# Patient Record
Sex: FEMALE | Race: WHITE | NOT HISPANIC OR LATINO | Employment: FULL TIME | URBAN - METROPOLITAN AREA
[De-identification: names, ages, dates, MRNs, and addresses within clinical notes are randomized per-mention and may not be internally consistent; named-entity substitution may affect disease eponyms.]

---

## 2019-03-20 ENCOUNTER — OCCUPATIONAL HEALTH (OUTPATIENT)
Dept: URGENT CARE | Facility: CLINIC | Age: 49
End: 2019-03-20

## 2019-03-20 DIAGNOSIS — Z02.83 ENCOUNTER FOR DRUG SCREENING: Primary | ICD-10-CM

## 2019-03-20 LAB
CTP QC/QA: YES
POC 10 PANEL DRUG SCREEN: NEGATIVE

## 2019-03-20 PROCEDURE — 80305 POCT RAPID DRUG SCREEN 10 PANEL: ICD-10-PCS | Mod: QW,S$GLB,, | Performed by: INTERNAL MEDICINE

## 2019-03-20 PROCEDURE — 80305 DRUG TEST PRSMV DIR OPT OBS: CPT | Mod: QW,S$GLB,, | Performed by: INTERNAL MEDICINE

## 2020-02-02 ENCOUNTER — OFFICE VISIT (OUTPATIENT)
Dept: URGENT CARE | Facility: CLINIC | Age: 50
End: 2020-02-02

## 2020-02-02 VITALS
SYSTOLIC BLOOD PRESSURE: 168 MMHG | BODY MASS INDEX: 43.32 KG/M2 | WEIGHT: 260 LBS | TEMPERATURE: 99 F | DIASTOLIC BLOOD PRESSURE: 90 MMHG | OXYGEN SATURATION: 96 % | HEART RATE: 114 BPM | RESPIRATION RATE: 20 BRPM | HEIGHT: 65 IN

## 2020-02-02 DIAGNOSIS — J10.1 INFLUENZA A: Primary | ICD-10-CM

## 2020-02-02 DIAGNOSIS — R50.9 FEVER, UNSPECIFIED FEVER CAUSE: ICD-10-CM

## 2020-02-02 LAB
CTP QC/QA: YES
FLUAV AG NPH QL: POSITIVE
FLUBV AG NPH QL: NEGATIVE

## 2020-02-02 PROCEDURE — 87804 POCT INFLUENZA A/B: ICD-10-PCS | Mod: 59,QW,TIER,S$GLB | Performed by: PHYSICIAN ASSISTANT

## 2020-02-02 PROCEDURE — 99203 PR OFFICE/OUTPT VISIT, NEW, LEVL III, 30-44 MIN: ICD-10-PCS | Mod: TIER,25,S$GLB, | Performed by: PHYSICIAN ASSISTANT

## 2020-02-02 PROCEDURE — 99203 OFFICE O/P NEW LOW 30 MIN: CPT | Mod: TIER,25,S$GLB, | Performed by: PHYSICIAN ASSISTANT

## 2020-02-02 PROCEDURE — 87804 INFLUENZA ASSAY W/OPTIC: CPT | Mod: QW,TIER,S$GLB, | Performed by: PHYSICIAN ASSISTANT

## 2020-02-02 RX ORDER — OSELTAMIVIR PHOSPHATE 75 MG/1
75 CAPSULE ORAL 2 TIMES DAILY
Qty: 10 CAPSULE | Refills: 0 | Status: SHIPPED | OUTPATIENT
Start: 2020-02-02 | End: 2020-02-07

## 2020-02-02 RX ORDER — ONDANSETRON 4 MG/1
4 TABLET, ORALLY DISINTEGRATING ORAL EVERY 8 HOURS PRN
Qty: 12 TABLET | Refills: 0 | Status: SHIPPED | OUTPATIENT
Start: 2020-02-02

## 2020-02-02 RX ORDER — DICLOFENAC SODIUM 75 MG/1
75 TABLET, DELAYED RELEASE ORAL 2 TIMES DAILY PRN
COMMUNITY
Start: 2020-01-13

## 2020-02-02 RX ORDER — METFORMIN HYDROCHLORIDE 1000 MG/1
1000 TABLET ORAL 2 TIMES DAILY WITH MEALS
COMMUNITY
Start: 2020-01-13

## 2020-02-02 NOTE — PATIENT INSTRUCTIONS
The Flu (Influenza)     The virus that causes the flu spreads through the air in droplets when someone who has the flu coughs, sneezes, laughs, or talks.   The flu (influenza) is an infection that affects your respiratory tract. This tract is made up of your mouth, nose, and lungs, and the passages between them. Unlike a cold, the flu can make you very ill. And it can lead to pneumonia, a serious lung infection. The flu can have serious complications and even cause death.  Who is at risk for the flu?  Anyone can get the flu. But you are more likely to become infected if you:  · Have a weakened immune system  · Work in a healthcare setting where you may be exposed to flu germs  · Live or work with someone who has the flu  · Havent had an annual flu shot  How does the flu spread?  The flu is caused by a virus. The virus spreads through the air in droplets when someone who has the flu coughs, sneezes, laughs, or talks. You can become infected when you inhale these viruses directly. You can also become infected when you touch a surface on which the droplets have landed and then transfer the germs to your eyes, nose, or mouth. Touching used tissues, or sharing utensils, drinking glasses, or a toothbrush from an infected person can expose you to flu viruses, too.  What are the symptoms of the flu?  Flu symptoms tend to come on quickly and may last a few days to a few weeks. They include:  · Fever usually higher than 100.4°F  (38°C) and chills  · Sore throat and headache  · Dry cough  · Runny nose  · Tiredness and weakness  · Muscle aches  Who is at risk for flu complications?  For some people, the flu can be very serious. The risk for complications is greater for:  · Children younger than age 5  · Adults ages 65 and older  · People with a chronic illness such as diabetes or heart, kidney, or lung disease  · People who live in a nursing home or long-term care facility   How is the flu treated?  The flu usually gets  better after 7 days or so. In some cases, your healthcare provider may prescribe an antiviral medicine. This may help you get well a little sooner. For the medicine to help, you need to take it as soon as possible (ideally within 48 hours) after your symptoms start. If you develop pneumonia or other serious illness, you may need to stay in the hospital.  Easing flu symptoms  · Drink lots of fluids such as water, juice, and warm soup. A good rule is to drink enough so that you urinate your normal amount.  · Get plenty of rest.  · Ask your healthcare provider what to take for fever and pain.  · Call your provider if your fever is 100.4°F (38°C) or higher, or you become dizzy, lightheaded, or short of breath.  Taking steps to protect others  · Wash your hands often, especially after coughing or sneezing. Or clean your hands with an alcohol-based hand  containing at least 60% alcohol.  · Cough or sneeze into a tissue. Then throw the tissue away and wash your hands. If you dont have a tissue, cough and sneeze into your elbow.  · Stay home until at least 24 hours after you no longer have a fever or chills. Be sure the fever isnt being hidden by fever-reducing medicine.  · Dont share food, utensils, drinking glasses, or a toothbrush with others.  · Ask your healthcare provider if others in your household should get antiviral medicine to help them avoid infection.  How can the flu be prevented?  · One of the best ways to avoid the flu is to get a flu vaccine each year. The virus that causes the flu changes from year to year. For that reason, healthcare providers recommend getting the flu vaccine each year, as soon as it's available in your area. The vaccine is given as a shot. Your healthcare provider can tell you which vaccine is right for you. A nasal spray is also available but is not recommended for the 9205-4754 flu season. The CDC says this is because the nasal spray did not seem to protect against the flu  over the last several flu seasons. In the past, it was meant for people ages 2 to 49.  · Wash your hands often. Frequent handwashing is a proven way to help prevent infection.  · Carry an alcohol-based hand gel containing at least 60% alcohol. Use it when you can't use soap and water. Then wash your hands as soon as you can.  · Avoid touching your eyes, nose, and mouth.  · At home and work, clean phones, computer keyboards, and toys often with disinfectant wipes.  · If possible, avoid close contact with others who have the flu or symptoms of the flu.  Handwashing tips  Handwashing is one of the best ways to prevent many common infections. If you are caring for or visiting someone with the flu, wash your hands each time you enter and leave the room. Follow these steps:  · Use warm water and plenty of soap. Rub your hands together well.  · Clean the whole hand, including under your nails, between your fingers, and up the wrists.  · Wash for at least 15 seconds.  · Rinse, letting the water run down your fingers, not up your wrists.  · Dry your hands well. Use a paper towel to turn off the faucet and open the door.  Using alcohol-based hand   Alcohol-based hand  are also a good choice. Use them when you can't use soap and water. Follow these steps:  · Squeeze about a tablespoon of gel into the palm of one hand.  · Rub your hands together briskly, cleaning the backs of your hands, the palms, between your fingers, and up the wrists.  · Rub until the gel is gone and your hands are completely dry.  Preventing the flu in healthcare settings  The flu is a special concern for people in hospitals and long-term care facilities. To help prevent the spread of flu, many hospitals and nursing homes take these steps:  · Healthcare providers wash their hands or use an alcohol-based hand  before and after treating each patient.  · People with the flu have private rooms and bathrooms or share a room with someone  with the same infection.  · People who are at high risk for the flu but don't have it are encouraged to get the flu and pneumonia vaccines.  · All healthcare workers are encouraged or required to get flu shots.   Date Last Reviewed: 12/1/2016  © 2625-5832 [employer]. [address] [place] [id number]. All rights reserved. This information is not intended as a substitute for professional medical care. Always follow your healthcare professional's instructions.      Patient Instructions   -Below are suggestions for symptomatic relief:              -Tylenol every 4 hours OR ibuprofen every 6 hours as needed for pain/fever.              -Salt water gargles to soothe throat pain.              -Chloroseptic spray also helps to numb throat pain.              -Nasal saline spray reduces inflammation and dryness.              -Warm face compresses to help with facial sinus pain/pressure.              -Vicks vapor rub at night.              -Flonase OTC or Nasacort OTC for nasal congestion.              -Simple foods like chicken noodle soup.              -Delsym helps with coughing at night              -Zyrtec/Claritin during the day & Benadryl at night may help with allergies.                If you DO NOT have Hypertension or any history of palpitations, it is ok to take over the counter Sudafed or Mucinex D or Allegra-D or Claritin-D or Zyrtec-D.  If you do take one of the above, it is ok to combine that with plain over the counter Mucinex or Allegra or Claritin or Zyrtec. If, for example, you are taking Zyrtec -D, you can combine that with Mucinex, but not Mucinex-D.  If you are taking Mucinex-D, you can combine that with plain Allegra or Claritin or Zyrtec.   If you DO have Hypertension or palpitations, it is safe to take Coricidin HBP for relief of sinus symptoms.    Please follow up with your Primary care provider within 2-5 days if your signs and symptoms have not resolved or worsen.     If your  condition worsens or fails to improve we recommend that you receive another evaluation at the emergency room immediately or contact your primary medical clinic to discuss your concerns.   You must understand that you have received an Urgent Care treatment only and that you may be released before all of your medical problems are known or treated. You, the patient, will arrange for follow up care as instructed.     RED FLAGS/WARNING SYMPTOMS DISCUSSED WITH PATIENT THAT WOULD WARRANT EMERGENT MEDICAL ATTENTION. PATIENT VERBALIZED UNDERSTANDING.

## 2020-02-02 NOTE — PROGRESS NOTES
"Subjective:       Patient ID: Renée Zavala is a 49 y.o. female.    Vitals:  height is 5' 5" (1.651 m) and weight is 117.9 kg (260 lb). Her oral temperature is 99.2 °F (37.3 °C). Her blood pressure is 168/90 (abnormal) and her pulse is 114 (abnormal). Her respiration is 20 and oxygen saturation is 96%.     Chief Complaint: Fever    Fever    This is a new problem. The current episode started in the past 7 days (X3 DAYS). The problem occurs constantly. The problem has been unchanged. Her temperature was unmeasured prior to arrival. Associated symptoms include congestion, coughing, nausea, a sore throat and vomiting. Pertinent negatives include no chest pain, diarrhea, headaches, rash or urinary pain. She has tried nothing for the symptoms.       Constitution: Positive for chills, fatigue, fever and generalized weakness.   HENT: Positive for congestion and sore throat.    Neck: Negative for painful lymph nodes.   Cardiovascular: Negative for chest pain and leg swelling.   Eyes: Negative for double vision and blurred vision.   Respiratory: Positive for cough and sputum production. Negative for shortness of breath.    Gastrointestinal: Positive for nausea and vomiting. Negative for diarrhea.   Genitourinary: Negative for dysuria, frequency, urgency and history of kidney stones.   Musculoskeletal: Negative for joint pain, joint swelling, muscle cramps and muscle ache.   Skin: Negative for color change, pale, rash and bruising.   Allergic/Immunologic: Negative for seasonal allergies.   Neurological: Negative for dizziness, history of vertigo, light-headedness, passing out and headaches.   Hematologic/Lymphatic: Negative for swollen lymph nodes.   Psychiatric/Behavioral: Negative for nervous/anxious, sleep disturbance and depression. The patient is not nervous/anxious.        Objective:      Physical Exam   Constitutional: She is oriented to person, place, and time. She appears well-developed and well-nourished. " She is cooperative.  Non-toxic appearance. She does not have a sickly appearance. She does not appear ill. No distress.   HENT:   Head: Normocephalic and atraumatic.   Right Ear: Hearing, tympanic membrane, external ear and ear canal normal.   Left Ear: Hearing, tympanic membrane, external ear and ear canal normal.   Nose: Rhinorrhea and sinus tenderness present. No mucosal edema, purulent discharge or nasal deformity. No epistaxis. Right sinus exhibits no maxillary sinus tenderness and no frontal sinus tenderness. Left sinus exhibits no maxillary sinus tenderness and no frontal sinus tenderness.   Mouth/Throat: Uvula is midline, oropharynx is clear and moist and mucous membranes are normal. No trismus in the jaw. Normal dentition. No uvula swelling. No oropharyngeal exudate, posterior oropharyngeal edema, posterior oropharyngeal erythema, tonsillar abscesses or cobblestoning. Tonsils are 1+ on the right. Tonsils are 1+ on the left. No tonsillar exudate.   Eyes: Conjunctivae and lids are normal. No scleral icterus.   Neck: Trachea normal, full passive range of motion without pain and phonation normal. Neck supple. No neck rigidity. No edema and no erythema present.   Cardiovascular: Normal rate, regular rhythm, normal heart sounds, intact distal pulses and normal pulses.   Pulmonary/Chest: Effort normal and breath sounds normal. No accessory muscle usage or stridor. No respiratory distress. She has no decreased breath sounds. She has no wheezes. She has no rhonchi. She has no rales. She exhibits no tenderness and no bony tenderness.   Abdominal: Soft. Normal appearance and bowel sounds are normal. She exhibits no distension, no abdominal bruit, no pulsatile midline mass and no mass. There is no tenderness.   Musculoskeletal: Normal range of motion. She exhibits no edema or deformity.   Lymphadenopathy:        Head (right side): No submental, no submandibular, no tonsillar, no preauricular, no posterior auricular and  no occipital adenopathy present.        Head (left side): No submental, no submandibular, no tonsillar, no preauricular, no posterior auricular and no occipital adenopathy present.     She has no cervical adenopathy.        Right cervical: No superficial cervical and no deep cervical adenopathy present.       Left cervical: No superficial cervical and no deep cervical adenopathy present.   Neurological: She is alert and oriented to person, place, and time. She has normal strength. She exhibits normal muscle tone. Coordination normal.   Skin: Skin is warm, dry, intact, not diaphoretic and not pale.   Psychiatric: She has a normal mood and affect. Her speech is normal and behavior is normal. Judgment and thought content normal. Cognition and memory are normal.   Nursing note and vitals reviewed.        Assessment:       1. Influenza A    2. Fever, unspecified fever cause        Plan:         Influenza A  -     oseltamivir (TAMIFLU) 75 MG capsule; Take 1 capsule (75 mg total) by mouth 2 (two) times daily. for 5 days  Dispense: 10 capsule; Refill: 0  -     ondansetron (ZOFRAN-ODT) 4 MG TbDL; Take 1 tablet (4 mg total) by mouth every 8 (eight) hours as needed (nausea).  Dispense: 12 tablet; Refill: 0    Fever, unspecified fever cause  -     POCT Influenza A/B     Review lab results with patient. Discussed diagnosis with patient as well as treatment and home care. Discussed return to clinic precautions vs ER precautions. All patients questions answered. Patient verbalized understanding. Patient agreed with plan of care.         The Flu (Influenza)     The virus that causes the flu spreads through the air in droplets when someone who has the flu coughs, sneezes, laughs, or talks.   The flu (influenza) is an infection that affects your respiratory tract. This tract is made up of your mouth, nose, and lungs, and the passages between them. Unlike a cold, the flu can make you very ill. And it can lead to pneumonia, a serious  lung infection. The flu can have serious complications and even cause death.  Who is at risk for the flu?  Anyone can get the flu. But you are more likely to become infected if you:  · Have a weakened immune system  · Work in a healthcare setting where you may be exposed to flu germs  · Live or work with someone who has the flu  · Havent had an annual flu shot  How does the flu spread?  The flu is caused by a virus. The virus spreads through the air in droplets when someone who has the flu coughs, sneezes, laughs, or talks. You can become infected when you inhale these viruses directly. You can also become infected when you touch a surface on which the droplets have landed and then transfer the germs to your eyes, nose, or mouth. Touching used tissues, or sharing utensils, drinking glasses, or a toothbrush from an infected person can expose you to flu viruses, too.  What are the symptoms of the flu?  Flu symptoms tend to come on quickly and may last a few days to a few weeks. They include:  · Fever usually higher than 100.4°F  (38°C) and chills  · Sore throat and headache  · Dry cough  · Runny nose  · Tiredness and weakness  · Muscle aches  Who is at risk for flu complications?  For some people, the flu can be very serious. The risk for complications is greater for:  · Children younger than age 5  · Adults ages 65 and older  · People with a chronic illness such as diabetes or heart, kidney, or lung disease  · People who live in a nursing home or long-term care facility   How is the flu treated?  The flu usually gets better after 7 days or so. In some cases, your healthcare provider may prescribe an antiviral medicine. This may help you get well a little sooner. For the medicine to help, you need to take it as soon as possible (ideally within 48 hours) after your symptoms start. If you develop pneumonia or other serious illness, you may need to stay in the hospital.  Easing flu symptoms  · Drink lots of fluids such  as water, juice, and warm soup. A good rule is to drink enough so that you urinate your normal amount.  · Get plenty of rest.  · Ask your healthcare provider what to take for fever and pain.  · Call your provider if your fever is 100.4°F (38°C) or higher, or you become dizzy, lightheaded, or short of breath.  Taking steps to protect others  · Wash your hands often, especially after coughing or sneezing. Or clean your hands with an alcohol-based hand  containing at least 60% alcohol.  · Cough or sneeze into a tissue. Then throw the tissue away and wash your hands. If you dont have a tissue, cough and sneeze into your elbow.  · Stay home until at least 24 hours after you no longer have a fever or chills. Be sure the fever isnt being hidden by fever-reducing medicine.  · Dont share food, utensils, drinking glasses, or a toothbrush with others.  · Ask your healthcare provider if others in your household should get antiviral medicine to help them avoid infection.  How can the flu be prevented?  · One of the best ways to avoid the flu is to get a flu vaccine each year. The virus that causes the flu changes from year to year. For that reason, healthcare providers recommend getting the flu vaccine each year, as soon as it's available in your area. The vaccine is given as a shot. Your healthcare provider can tell you which vaccine is right for you. A nasal spray is also available but is not recommended for the 3615-2469 flu season. The CDC says this is because the nasal spray did not seem to protect against the flu over the last several flu seasons. In the past, it was meant for people ages 2 to 49.  · Wash your hands often. Frequent handwashing is a proven way to help prevent infection.  · Carry an alcohol-based hand gel containing at least 60% alcohol. Use it when you can't use soap and water. Then wash your hands as soon as you can.  · Avoid touching your eyes, nose, and mouth.  · At home and work, clean phones,  computer keyboards, and toys often with disinfectant wipes.  · If possible, avoid close contact with others who have the flu or symptoms of the flu.  Handwashing tips  Handwashing is one of the best ways to prevent many common infections. If you are caring for or visiting someone with the flu, wash your hands each time you enter and leave the room. Follow these steps:  · Use warm water and plenty of soap. Rub your hands together well.  · Clean the whole hand, including under your nails, between your fingers, and up the wrists.  · Wash for at least 15 seconds.  · Rinse, letting the water run down your fingers, not up your wrists.  · Dry your hands well. Use a paper towel to turn off the faucet and open the door.  Using alcohol-based hand   Alcohol-based hand  are also a good choice. Use them when you can't use soap and water. Follow these steps:  · Squeeze about a tablespoon of gel into the palm of one hand.  · Rub your hands together briskly, cleaning the backs of your hands, the palms, between your fingers, and up the wrists.  · Rub until the gel is gone and your hands are completely dry.  Preventing the flu in healthcare settings  The flu is a special concern for people in hospitals and long-term care facilities. To help prevent the spread of flu, many hospitals and nursing homes take these steps:  · Healthcare providers wash their hands or use an alcohol-based hand  before and after treating each patient.  · People with the flu have private rooms and bathrooms or share a room with someone with the same infection.  · People who are at high risk for the flu but don't have it are encouraged to get the flu and pneumonia vaccines.  · All healthcare workers are encouraged or required to get flu shots.   Date Last Reviewed: 12/1/2016  © 1477-0941 [employer]. [address] [place] [address], [place] [address]. All rights reserved. This information is not intended as a substitute for  professional medical care. Always follow your healthcare professional's instructions.      Patient Instructions   -Below are suggestions for symptomatic relief:              -Tylenol every 4 hours OR ibuprofen every 6 hours as needed for pain/fever.              -Salt water gargles to soothe throat pain.              -Chloroseptic spray also helps to numb throat pain.              -Nasal saline spray reduces inflammation and dryness.              -Warm face compresses to help with facial sinus pain/pressure.              -Vicks vapor rub at night.              -Flonase OTC or Nasacort OTC for nasal congestion.              -Simple foods like chicken noodle soup.              -Delsym helps with coughing at night              -Zyrtec/Claritin during the day & Benadryl at night may help with allergies.                If you DO NOT have Hypertension or any history of palpitations, it is ok to take over the counter Sudafed or Mucinex D or Allegra-D or Claritin-D or Zyrtec-D.  If you do take one of the above, it is ok to combine that with plain over the counter Mucinex or Allegra or Claritin or Zyrtec. If, for example, you are taking Zyrtec -D, you can combine that with Mucinex, but not Mucinex-D.  If you are taking Mucinex-D, you can combine that with plain Allegra or Claritin or Zyrtec.   If you DO have Hypertension or palpitations, it is safe to take Coricidin HBP for relief of sinus symptoms.    Please follow up with your Primary care provider within 2-5 days if your signs and symptoms have not resolved or worsen.     If your condition worsens or fails to improve we recommend that you receive another evaluation at the emergency room immediately or contact your primary medical clinic to discuss your concerns.   You must understand that you have received an Urgent Care treatment only and that you may be released before all of your medical problems are known or treated. You, the patient, will arrange for follow up care  as instructed.     RED FLAGS/WARNING SYMPTOMS DISCUSSED WITH PATIENT THAT WOULD WARRANT EMERGENT MEDICAL ATTENTION. PATIENT VERBALIZED UNDERSTANDING.

## 2023-04-13 ENCOUNTER — CLINICAL SUPPORT (OUTPATIENT)
Dept: REHABILITATION | Facility: HOSPITAL | Age: 53
End: 2023-04-13
Payer: MEDICAID

## 2023-04-13 DIAGNOSIS — M25.669 STIFF-KNEE GAIT: ICD-10-CM

## 2023-04-13 DIAGNOSIS — R26.89 STIFF-KNEE GAIT: ICD-10-CM

## 2023-04-13 DIAGNOSIS — Z74.09 IMPAIRED FUNCTIONAL MOBILITY, BALANCE, GAIT, AND ENDURANCE: ICD-10-CM

## 2023-04-13 DIAGNOSIS — M62.81 QUADRICEPS WEAKNESS: ICD-10-CM

## 2023-04-13 DIAGNOSIS — M25.662 DECREASED ROM OF LEFT KNEE: ICD-10-CM

## 2023-04-13 DIAGNOSIS — M25.662 STIFFNESS OF LEFT KNEE: ICD-10-CM

## 2023-04-13 PROCEDURE — 97110 THERAPEUTIC EXERCISES: CPT | Mod: PO | Performed by: PHYSICAL THERAPIST

## 2023-04-13 PROCEDURE — 97161 PT EVAL LOW COMPLEX 20 MIN: CPT | Mod: PO | Performed by: PHYSICAL THERAPIST

## 2023-04-13 NOTE — PROGRESS NOTES
[facility]   Physical Therapy Initial Evaluation     Date: 4/13/2023   [id number]    Therapy Diagnosis:   Encounter Diagnoses   Name Primary?    Decreased ROM of left knee     Quadriceps weakness     Stiffness of left knee     Stiff-knee gait     Impaired functional mobility, balance, gait, and endurance      Physician: Carlos Kinney MD    Physician Orders: PT Eval and Treat left knee   Medical Diagnosis from Referral:   Status post left unicompartmental knee replacement [Z96.652]  Evaluation Date: 4/13/2023  Authorization Period Expiration: 12/31/2023  Plan of Care Expiration: 7/13/2023  Progress Note Due: 5/13/2023  Visit # / Visits authorized: 1/ 1   FOTO: 1/ 3     Precautions: Standard    Time In: 1510  Time Out: 1625  Total Appointment Time (timed & untimed codes): 75 minutes      SUBJECTIVE       History of current condition: Renée reports intermittent pain left knee.       Date of onset: Surgery on 3/29/2023 for a left TKA. She underwent a right TKA on 8/10/2022.   Falls: 0  Pain:  Current 4/10, worst 8/10, best 0/10   Location: left anterior knee    Description: Throbbing and Tight  Aggravating Factors: bending the knee   Easing Factors: ice, rest, and elevation  Sleep: disturbed     Current Level of Function:   Personal - post op limitation  Domestic - post-op limitation   Community - post op-limitation   Occupation - NA  Sports/recreation/fitness - NA  Prior Level of Function: difficulty with stair and making her bed. ADL performed in short episodes    Prior Therapy: following R-TKA  Social History: single  lives alone in an half double. Stairs one flight 16 steps indoors and 2 steps outdoors   Occupation: not employed     Patients goals: to return to employment   Imaging, not available :       Medical History: Type II DM controlled, HTN controlled   No past medical history on file.    Surgical History: right TKA  "8/10/2022  Renée Zavala  has no past surgical history on file.    Medications: currently Oxycodone. 350mg,  methocarbamol 500mg,   Renée has a current medication list which includes the following prescription(s): diclofenac, metformin, and ondansetron.    Allergies:   Review of patient's allergies indicates:  No Known Allergies       OBJECTIVE       Physical Appearance: the left knee is edematous. Wound is clean without seepage / exudate. The incision site is maintained with steri-strips and covered with non-adherent gauze and wrapped with a kerlix roll.   Posture Alignment: stands with a flexed posture   Sensation: Light Touch: Intact  Palpation: tender over the incision line    Knee  Left  Pain/Dysfunction with Movement    AROM PROM MMT    Flexion 85 87 2+/5    Extension -33 -20 2+/5            Gait: With AD.  Device Used -  Rolling walker  Analysis: independent with reduced knee flexion during toe off and swing phase of the gait cycle.   Bed Mobility:Independent  Transfers: Independent    Girth: inches   Left Right   2" superior mid patella   17.75  19    Mid patella 16.75 17.5   Joint Line 14.5 15.5             Limitation/Restriction for FOTO knee IE Survey    Therapist reviewed FOTO scores for Renée Zavala on 4/13/2023.   FOTO documents entered into [employer] - see Media section.    Limitation Score: 69%         TREATMENT     Total Treatment time (time-based codes) separate from Evaluation: 30 minutes      Renée received the treatments listed below:      therapeutic exercises to develop strength, ROM, flexibility, and pan reduction  for 35 minutes including:  -posterior knee stretch with ankle pumps 3'  -quad set with towel at heel 7" x 15   -quad set with towel behind the knee 7" x 15   -ball squeeze hold 7" x 12   -Heel slides x12   -seated heel slides x15   -seated sust flexion 2'    manual therapy techniques:  were applied to the: left knee  for - minutes, " including:  -    neuromuscular re-education activities to improve: Balance and Proprioception for - minutes. The following activities were included:        gait training to improve functional mobility and safety for 6  minutes, including:  -precaution instruction   -gait pattern with emphasis on knee flexion at toe off and swing thru phase     OTHER:   The bandage was removed and observed for exudate or erythema around the incision site. New gauze pad was placed over the incision and wrapped. Tubi- stocking was applied. Instructed to change by Saturday.     PATIENT EDUCATION AND HOME EXERCISES     Education provided:   - gait precautions   -importance of gaining full knee extension actively and passively,     Written Home Exercises Provided: yes.  Exercises were reviewed and Renée was able to demonstrate them prior to the end of the session.  Renée demonstrated good  understanding of the education provided.     See EMR under Patient Instructions for exercises provided 4/13/2023.  .     ASSESSMENT     Renée is a 52 y.o. female referred to outpatient Physical Therapy with a medical diagnosis of Status post left unicompartmental knee replacement [Z96.652].  Patient presents with clinical findings of limited knee extension and flexion actively and passively. Flexion to 90 has good potential. She experiences increased discomfort at end ranges of flexion and extension. Her pain tolerance is low and reflected in her apprehensions.  Response to the exercises performed was favorable. She also felt reassured after discussion regarding probability of complications from exercises and allowing knee flexion.     Patient prognosis is Good.   Patientt will benefit from skilled outpatient Physical Therapy to address the deficits stated above and in the chart below, provide patient /family education, and to maximize patientt's level of independence.     Plan of care discussed with patient: Yes  Patient's spiritual,  cultural and educational needs considered and patient is agreeable to the plan of care and goals as stated below:     Anticipated Barriers for therapy: none     Medical Necessity is demonstrated by the following  History  Co-morbidities and personal factors that may impact the plan of care Co-morbidities:   No past medical history on file.    Personal Factors:   coping style     low   Examination  Body Structures and Functions, activity limitations and participation restrictions that may impact the plan of care Body Regions:   Left knee and LE     Body Systems:    strength  balance  gait  transfers  motor control    Participation Restrictions:   None    Activity limitations:   Learning and applying knowledge  no deficits    General Tasks and Commands  no deficits    Communication  no deficits    Mobility  lifting and carrying objects  walking  driving (bike, car, motorcycle)  Stairs   Squatting     Self care  Post-op limitations    Domestic Life  Post-op limitations     Interactions/Relationships  no deficits    Life Areas  no deficits    Community and Social Life  Post-op limitations          low   Clinical Presentation stable and uncomplicated low   Decision Making/ Complexity Score: low     Goals:  Short Term Goals ( 1 week )   1. Pt will report reduced pain by 20 to 40% or greater for improved mobility, sleep  and ambulation. ONGOING  2. Patients knee edema reduced by 1/4 to 1/2 inch or greater to enhance flexion ROM and knee comfort. ONGOING  3. Pt to report minimal to no pain to palpation for improved level of comfort. ONGOING  4. Pt to demonstrate symmetrical weight bearing w/o increased pain for stability and functional ambulation .ONGOING  5. Pts neuromuscular response will be enhanced for unilateral standing stability and balance ONGOING  6. Pt to achieve 90 degrees of passive knee flexion for restoring functional knee mobility, ambulating with proper gait pattern and sit to stand ability. ONGOING  7 Pt to  report understanding of all instruction pertinent to patient safety , gait instruction and HEP.  ONGOING  8. Pt to exhibit correct return demonstration of exercises for self-management and independence with HEP. ONGOING      Long Term Goals: 12 weeks  1. Pt will demonstrate increased knee flexion AROM to 120 degrees or greater for return to functional activity. ONGOING  2. Pt will demonstrate increased knee extension AROM to 0 degrees for standing stability ONGOING  3. Pt will demonstrate increased RLE strength by 1/2 to 1 grade for improved functional stability. ONGOING  4. Pt to demonstrate standing stability unsupported on dynamic surfaces for functional return to ADL and community ambulation. ONGOING  5.The patient will be independent amb with no assistive device on all surfaces for community distances and return to work. ONGOING  6..Pt to demonstrate independence with HEP for self management. ONGOING    PLAN   Plan of care Certification: 4/13/2023 to 7/13/2023.    Outpatient Physical Therapy 2 times weekly for 10 weeks to include the following interventions: Gait Training, Manual Therapy, Neuromuscular Re-ed, Patient Education, and Therapeutic Exercise.     Rob Arellano, PT      I CERTIFY THE NEED FOR THESE SERVICES FURNISHED UNDER THIS PLAN OF TREATMENT AND WHILE UNDER MY CARE   Physician's comments:     Physician's Signature: ___________________________________________________

## 2023-04-13 NOTE — PLAN OF CARE
[facility]   Physical Therapy Initial Evaluation     Date: 4/13/2023   [id number]    Therapy Diagnosis:   Encounter Diagnoses   Name Primary?    Decreased ROM of left knee     Quadriceps weakness     Stiffness of left knee     Stiff-knee gait     Impaired functional mobility, balance, gait, and endurance      Physician: Carlos Kinney MD    Physician Orders: PT Eval and Treat left knee   Medical Diagnosis from Referral:   Status post left unicompartmental knee replacement [Z96.652]  Evaluation Date: 4/13/2023  Authorization Period Expiration: 12/31/2023  Plan of Care Expiration: 7/13/2023  Progress Note Due: 5/13/2023  Visit # / Visits authorized: 1/ 1   FOTO: 1/ 3     Precautions: Standard    Time In: 1510  Time Out: 1625  Total Appointment Time (timed & untimed codes): 75 minutes      SUBJECTIVE       History of current condition: Renée reports intermittent pain left knee.       Date of onset: Surgery on 3/29/2023 for a left TKA. She underwent a right TKA on 8/10/2022.   Falls: 0  Pain:  Current 4/10, worst 8/10, best 0/10   Location: left anterior knee    Description: Throbbing and Tight  Aggravating Factors: bending the knee   Easing Factors: ice, rest, and elevation  Sleep: disturbed     Current Level of Function:   Personal - post op limitation  Domestic - post-op limitation   Community - post op-limitation   Occupation - NA  Sports/recreation/fitness - NA  Prior Level of Function: difficulty with stair and making her bed. ADL performed in short episodes    Prior Therapy: following R-TKA  Social History: single  lives alone in an half double. Stairs one flight 16 steps indoors and 2 steps outdoors   Occupation: not employed     Patients goals: to return to employment   Imaging, not available :       Medical History: Type II DM controlled, HTN controlled   No past medical history on file.    Surgical History: right TKA  "8/10/2022  Renée Zavala  has no past surgical history on file.    Medications: currently Oxycodone. 350mg,  methocarbamol 500mg,   Renée has a current medication list which includes the following prescription(s): diclofenac, metformin, and ondansetron.    Allergies:   Review of patient's allergies indicates:  No Known Allergies       OBJECTIVE       Physical Appearance: the left knee is edematous. Wound is clean without seepage / exudate. The incision site is maintained with steri-strips and covered with non-adherent gauze and wrapped with a kerlix roll.   Posture Alignment: stands with a flexed posture   Sensation: Light Touch: Intact  Palpation: tender over the incision line    Knee  Left  Pain/Dysfunction with Movement    AROM PROM MMT    Flexion 85 87 2+/5    Extension -33 -20 2+/5            Gait: With AD.  Device Used -  Rolling walker  Analysis: independent with reduced knee flexion during toe off and swing phase of the gait cycle.   Bed Mobility:Independent  Transfers: Independent    Girth: inches   Left Right   2" superior mid patella   17.75  19    Mid patella 16.75 17.5   Joint Line 14.5 15.5             Limitation/Restriction for FOTO knee IE Survey    Therapist reviewed FOTO scores for Renée Zavala on 4/13/2023.   FOTO documents entered into [employer] - see Media section.    Limitation Score: 69%         TREATMENT     Total Treatment time (time-based codes) separate from Evaluation: 30 minutes      Renée received the treatments listed below:      therapeutic exercises to develop strength, ROM, flexibility, and pan reduction  for 35 minutes including:  -posterior knee stretch with ankle pumps 3'  -quad set with towel at heel 7" x 15   -quad set with towel behind the knee 7" x 15   -ball squeeze hold 7" x 12   -Heel slides x12   -seated heel slides x15   -seated sust flexion 2'    manual therapy techniques:  were applied to the: left knee  for - minutes, " including:  -    neuromuscular re-education activities to improve: Balance and Proprioception for - minutes. The following activities were included:        gait training to improve functional mobility and safety for 6  minutes, including:  -precaution instruction   -gait pattern with emphasis on knee flexion at toe off and swing thru phase     OTHER:   The bandage was removed and observed for exudate or erythema around the incision site. New gauze pad was placed over the incision and wrapped. Tubi- stocking was applied. Instructed to change by Saturday.     PATIENT EDUCATION AND HOME EXERCISES     Education provided:   - gait precautions   -importance of gaining full knee extension actively and passively,     Written Home Exercises Provided: yes.  Exercises were reviewed and Renée was able to demonstrate them prior to the end of the session.  Renée demonstrated good  understanding of the education provided.     See EMR under Patient Instructions for exercises provided 4/13/2023.  .     ASSESSMENT     Renée is a 52 y.o. female referred to outpatient Physical Therapy with a medical diagnosis of Status post left unicompartmental knee replacement [Z96.652].  Patient presents with clinical findings of limited knee extension and flexion actively and passively. Flexion to 90 has good potential. She experiences increased discomfort at end ranges of flexion and extension. Her pain tolerance is low and reflected in her apprehensions.  Response to the exercises performed was favorable. She also felt reassured after discussion regarding probability of complications from exercises and allowing knee flexion.     Patient prognosis is Good.   Patientt will benefit from skilled outpatient Physical Therapy to address the deficits stated above and in the chart below, provide patient /family education, and to maximize patientt's level of independence.     Plan of care discussed with patient: Yes  Patient's spiritual,  cultural and educational needs considered and patient is agreeable to the plan of care and goals as stated below:     Anticipated Barriers for therapy: none     Medical Necessity is demonstrated by the following  History  Co-morbidities and personal factors that may impact the plan of care Co-morbidities:   No past medical history on file.    Personal Factors:   coping style     low   Examination  Body Structures and Functions, activity limitations and participation restrictions that may impact the plan of care Body Regions:   Left knee and LE     Body Systems:    strength  balance  gait  transfers  motor control    Participation Restrictions:   None    Activity limitations:   Learning and applying knowledge  no deficits    General Tasks and Commands  no deficits    Communication  no deficits    Mobility  lifting and carrying objects  walking  driving (bike, car, motorcycle)  Stairs   Squatting     Self care  Post-op limitations    Domestic Life  Post-op limitations     Interactions/Relationships  no deficits    Life Areas  no deficits    Community and Social Life  Post-op limitations          low   Clinical Presentation stable and uncomplicated low   Decision Making/ Complexity Score: low     Goals:  Short Term Goals ( 1 week )   1. Pt will report reduced pain by 20 to 40% or greater for improved mobility, sleep  and ambulation. ONGOING  2. Patients knee edema reduced by 1/4 to 1/2 inch or greater to enhance flexion ROM and knee comfort. ONGOING  3. Pt to report minimal to no pain to palpation for improved level of comfort. ONGOING  4. Pt to demonstrate symmetrical weight bearing w/o increased pain for stability and functional ambulation .ONGOING  5. Pts neuromuscular response will be enhanced for unilateral standing stability and balance ONGOING  6. Pt to achieve 90 degrees of passive knee flexion for restoring functional knee mobility, ambulating with proper gait pattern and sit to stand ability. ONGOING  7 Pt to  report understanding of all instruction pertinent to patient safety , gait instruction and HEP.  ONGOING  8. Pt to exhibit correct return demonstration of exercises for self-management and independence with HEP. ONGOING      Long Term Goals: 12 weeks  1. Pt will demonstrate increased knee flexion AROM to 120 degrees or greater for return to functional activity. ONGOING  2. Pt will demonstrate increased knee extension AROM to 0 degrees for standing stability ONGOING  3. Pt will demonstrate increased RLE strength by 1/2 to 1 grade for improved functional stability. ONGOING  4. Pt to demonstrate standing stability unsupported on dynamic surfaces for functional return to ADL and community ambulation. ONGOING  5.The patient will be independent amb with no assistive device on all surfaces for community distances and return to work. ONGOING  6..Pt to demonstrate independence with HEP for self management. ONGOING    PLAN   Plan of care Certification: 4/13/2023 to 7/13/2023.    Outpatient Physical Therapy 2 times weekly for 10 weeks to include the following interventions: Gait Training, Manual Therapy, Neuromuscular Re-ed, Patient Education, and Therapeutic Exercise.     Rbo Arellano, PT      I CERTIFY THE NEED FOR THESE SERVICES FURNISHED UNDER THIS PLAN OF TREATMENT AND WHILE UNDER MY CARE   Physician's comments:     Physician's Signature: ___________________________________________________

## 2023-04-14 ENCOUNTER — CLINICAL SUPPORT (OUTPATIENT)
Dept: REHABILITATION | Facility: HOSPITAL | Age: 53
End: 2023-04-14
Payer: MEDICAID

## 2023-04-14 DIAGNOSIS — M25.662 STIFFNESS OF LEFT KNEE: ICD-10-CM

## 2023-04-14 DIAGNOSIS — M25.662 DECREASED ROM OF LEFT KNEE: Primary | ICD-10-CM

## 2023-04-14 DIAGNOSIS — M25.669 STIFF-KNEE GAIT: ICD-10-CM

## 2023-04-14 DIAGNOSIS — Z74.09 IMPAIRED FUNCTIONAL MOBILITY, BALANCE, GAIT, AND ENDURANCE: ICD-10-CM

## 2023-04-14 DIAGNOSIS — M62.81 QUADRICEPS WEAKNESS: ICD-10-CM

## 2023-04-14 DIAGNOSIS — R26.89 STIFF-KNEE GAIT: ICD-10-CM

## 2023-04-14 PROCEDURE — 97110 THERAPEUTIC EXERCISES: CPT | Mod: PO | Performed by: PHYSICAL THERAPIST

## 2023-04-14 NOTE — PROGRESS NOTES
"              [facility]   Physical Therapy Treatment Note     [id number]    Therapy Diagnosis:   Encounter Diagnoses   Name Primary?    Decreased ROM of left knee Yes    Quadriceps weakness     Stiffness of left knee     Stiff-knee gait     Impaired functional mobility, balance, gait, and endurance      Physician: Carlos Kinney MD    Visit Date: 4/14/2023    Physician Orders: PT Eval and Treat left knee   Medical Diagnosis from Referral:   Status post left unicompartmental knee replacement [Z96.652]  Evaluation Date: 4/13/2023  Authorization Period Expiration: 12/31/2023  Plan of Care Expiration: 7/13/2023  Progress Note Due: 5/13/2023  Visit # / Visits authorized: 1/ 1   FOTO: 1/ 3   PTA Visit #: 0/5     Precautions: Standard    Time In: 1010  Time Out: 1110   Total Billable Time: 60 minutes      SUBJECTIVE     Pt reports: the knee is not hurting but only a little tight. The bandage hurt last night and I had to cut it off. Felt better after.  She was issued a home exercise program. Did try to do the stretches of the back of the knee after the session.   Response to previous treatment: IE performed   Functional change: ongoing     Pain: 0/10  Location: left knee        OBJECTIVE           TREATMENT        Renée received the treatments listed below:    - THERAPEUTIC EXERCISES to develop  strength, ROM, flexibility, and pain reduction  for 55 minutes including       .BOLD INDICATES ACTIVITIES PERFORMED / DISCUSSED     Leg press   Recumbent bike  Upright bike   UE ergometer  Treadmill   Elliptical       THERAPEUTIC EXERCISE  SUPINE  -posterior knee stretch with ankle pumps 3'  -quad set with towel at heel 7" x 20  -quad set with towel behind the knee 7" x 20  -ball squeeze hold 7" x 12   -Heel slides x12   -SLR x15  -SAQ x 20     SIDELYING    PRONE    STANDING.    SITTING  -seated heel slides x15   -seated sust flexion 2'    MANUAL " THERAPY TECHNIQUES  were applied to left knee  for - minutes.    MODALITY:  Ice applied to the left knee for 10 min at EOS.       PATIENT EDUCATION AND HOME EXERCISES     Home Exercises Provided and Patient Education Provided     Education provided:   - - gait precautions   -importance of gaining full knee extension actively and passively,      Written Home Exercises Provided: yes.  Exercises were reviewed and Renée was able to demonstrate them prior to the end of the session.  Renée demonstrated good  understanding of the education provided.      See EMR under Patient Instructions for exercises provided 4/13/2023.      ASSESSMENT     The patient performed the activities noted. She remains shy of 90 flexion at this time. Performed the exercises noted and demonstrated ability to perform SLR as well as active knee extension with SAQ. Progress with CC weight shifting, heel raise and gastroc / achilles stretching.     Renée Is progressing towards her goals.   Pt prognosis is Good.     Pt will continue to benefit from skilled outpatient physical therapy to address the deficits listed in the problem list box on initial evaluation, provide pt/family education and to maximize pt's level of independence in the home and community environment.     Pt's spiritual, cultural and educational needs considered and pt agreeable to plan of care and goals.     Anticipated barriers to physical therapy: none     Goals:   Short Term Goals ( 1 week )   1. Pt will report reduced pain by 20 to 40% or greater for improved mobility, sleep  and ambulation. ONGOING  2. Patients knee edema reduced by 1/4 to 1/2 inch or greater to enhance flexion ROM and knee comfort. ONGOING  3. Pt to report minimal to no pain to palpation for improved level of comfort. ONGOING  4. Pt to demonstrate symmetrical weight bearing w/o increased pain for stability and functional ambulation .ONGOING  5. Pts neuromuscular response will be enhanced for  unilateral standing stability and balance ONGOING  6. Pt to achieve 90 degrees of passive knee flexion for restoring functional knee mobility, ambulating with proper gait pattern and sit to stand ability. ONGOING  7 Pt to report understanding of all instruction pertinent to patient safety , gait instruction and HEP.  ONGOING  8. Pt to exhibit correct return demonstration of exercises for self-management and independence with HEP. ONGOING        Long Term Goals: 12 weeks  1. Pt will demonstrate increased knee flexion AROM to 120 degrees or greater for return to functional activity. ONGOING  2. Pt will demonstrate increased knee extension AROM to 0 degrees for standing stability ONGOING  3. Pt will demonstrate increased RLE strength by 1/2 to 1 grade for improved functional stability. ONGOING  4. Pt to demonstrate standing stability unsupported on dynamic surfaces for functional return to ADL and community ambulation. ONGOING  5.The patient will be independent amb with no assistive device on all surfaces for community distances and return to work. ONGOING  6..Pt to demonstrate independence with HEP for self management. ONGOING     PLAN   Plan of care Certification: 4/13/2023 to 7/13/2023.     Outpatient Physical Therapy 2 times weekly for 10 weeks to include the following interventions: Gait Training, Manual Therapy, Neuromuscular Re-ed, Patient Education, and Therapeutic Exercise.       Rob Arellano, PT

## 2023-04-18 ENCOUNTER — CLINICAL SUPPORT (OUTPATIENT)
Dept: REHABILITATION | Facility: HOSPITAL | Age: 53
End: 2023-04-18
Payer: MEDICAID

## 2023-04-18 DIAGNOSIS — M25.662 DECREASED ROM OF LEFT KNEE: Primary | ICD-10-CM

## 2023-04-18 DIAGNOSIS — M25.669 STIFF-KNEE GAIT: ICD-10-CM

## 2023-04-18 DIAGNOSIS — Z74.09 IMPAIRED FUNCTIONAL MOBILITY, BALANCE, GAIT, AND ENDURANCE: ICD-10-CM

## 2023-04-18 DIAGNOSIS — M62.81 QUADRICEPS WEAKNESS: ICD-10-CM

## 2023-04-18 DIAGNOSIS — M25.662 STIFFNESS OF LEFT KNEE: ICD-10-CM

## 2023-04-18 DIAGNOSIS — R26.89 STIFF-KNEE GAIT: ICD-10-CM

## 2023-04-18 PROCEDURE — 97110 THERAPEUTIC EXERCISES: CPT | Mod: PO | Performed by: PHYSICAL THERAPIST

## 2023-04-18 NOTE — PROGRESS NOTES
"              [facility]   Physical Therapy Treatment Note     [id number]    Therapy Diagnosis:   Encounter Diagnoses   Name Primary?    Decreased ROM of left knee Yes    Quadriceps weakness     Stiffness of left knee     Stiff-knee gait     Impaired functional mobility, balance, gait, and endurance      Physician: Carlos Kinney MD    Visit Date: 4/18/2023    Physician Orders: PT Eval and Treat left knee   Medical Diagnosis from Referral:   Status post left unicompartmental knee replacement [Z96.652]  Evaluation Date: 4/13/2023  Authorization Period Expiration: 12/31/2023  Plan of Care Expiration: 7/13/2023  Progress Note Due: 5/13/2023  Visit # / Visits authorized: 1/ 1   FOTO: 1/ 3   PTA Visit #: 0/5     Precautions: Standard    Time In: 910  Time Out: 1015  Total Billable Time: 65 minutes      SUBJECTIVE     Pt reports: tightness but no significant pain.     She was issued a home exercise program. Doing the exercises at home. I feel good after I do the exercise at home.   Response to previous treatment: felt good.   Functional change: ongoing     Pain: 0/10  Location: left knee        OBJECTIVE       Knee  Left  Pain/Dysfunction with Movement    AROM PROM MMT    Flexion 85  90 87  95 2+/5    Extension -33 -20 -20  -20 2+/5      TREATMENT        Renée received the treatments listed below:    - THERAPEUTIC EXERCISES to develop  strength, ROM, flexibility, and pain reduction  for 60 minutes including       .BOLD INDICATES ACTIVITIES PERFORMED / DISCUSSED     Leg press   Recumbent bike  Upright bike   UE ergometer  Treadmill   Elliptical       THERAPEUTIC EXERCISE  SUPINE  -posterior knee stretch with ankle pumps 3'  -quad set with towel at heel 7" x 20  -quad set with towel behind the knee 7" x 20  -ball squeeze hold 7" x 12  -Heel slides  x15   -SLR   x20  -SAQ  2 x 15 red roll 8" "     SIDELYING    PRONE    STANDING.    SITTING  -seated heel slides x15   -seated sust flexion 2'    MANUAL THERAPY TECHNIQUES  were applied to left knee  for 3 minutes.  -passive PA at the knee with tibial ER to enhance passive extension.     MODALITY:  Ice applied to the left knee for 10 min at EOS.       PATIENT EDUCATION AND HOME EXERCISES     Home Exercises Provided and Patient Education Provided     Education provided:   - - gait precautions   -importance of gaining full knee extension actively and passively,      Written Home Exercises Provided: yes.  Exercises were reviewed and Renée was able to demonstrate them prior to the end of the session.  Renée demonstrated good  understanding of the education provided.      See EMR under Patient Instructions for exercises provided 4/13/2023.      ASSESSMENT     The routine was performed as noted. She demonstrated a passive extension leg that has persisted mainly due to patients intolerance for pain. She also encounters pain at end range of flexion. The patient is exercise compliant however her pain tolerance remains an inhibitor for progression at this time. She may be possibly exercising too much. Continue to stress quad activity, passive flexion and extension.    Renée Is progressing towards her goals.   Pt prognosis is Good.     Pt will continue to benefit from skilled outpatient physical therapy to address the deficits listed in the problem list box on initial evaluation, provide pt/family education and to maximize pt's level of independence in the home and community environment.     Pt's spiritual, cultural and educational needs considered and pt agreeable to plan of care and goals.     Anticipated barriers to physical therapy: none     Goals:   Short Term Goals ( 1 week )   1. Pt will report reduced pain by 20 to 40% or greater for improved mobility, sleep  and ambulation. ONGOING  2. Patients knee edema reduced by 1/4 to 1/2 inch or greater to  enhance flexion ROM and knee comfort. ONGOING  3. Pt to report minimal to no pain to palpation for improved level of comfort. ONGOING  4. Pt to demonstrate symmetrical weight bearing w/o increased pain for stability and functional ambulation .ONGOING  5. Pts neuromuscular response will be enhanced for unilateral standing stability and balance ONGOING  6. Pt to achieve 90 degrees of passive knee flexion for restoring functional knee mobility, ambulating with proper gait pattern and sit to stand ability. ONGOING  7 Pt to report understanding of all instruction pertinent to patient safety , gait instruction and HEP.  ONGOING  8. Pt to exhibit correct return demonstration of exercises for self-management and independence with HEP. ONGOING        Long Term Goals: 12 weeks  1. Pt will demonstrate increased knee flexion AROM to 120 degrees or greater for return to functional activity. ONGOING  2. Pt will demonstrate increased knee extension AROM to 0 degrees for standing stability ONGOING  3. Pt will demonstrate increased RLE strength by 1/2 to 1 grade for improved functional stability. ONGOING  4. Pt to demonstrate standing stability unsupported on dynamic surfaces for functional return to ADL and community ambulation. ONGOING  5.The patient will be independent amb with no assistive device on all surfaces for community distances and return to work. ONGOING  6..Pt to demonstrate independence with HEP for self management. ONGOING     PLAN   Plan of care Certification: 4/13/2023 to 7/13/2023.     Outpatient Physical Therapy 2 times weekly for 10 weeks to include the following interventions: Gait Training, Manual Therapy, Neuromuscular Re-ed, Patient Education, and Therapeutic Exercise.       Rob Arellano, PT

## 2023-04-20 ENCOUNTER — CLINICAL SUPPORT (OUTPATIENT)
Dept: REHABILITATION | Facility: HOSPITAL | Age: 53
End: 2023-04-20
Payer: MEDICAID

## 2023-04-20 DIAGNOSIS — Z74.09 IMPAIRED FUNCTIONAL MOBILITY, BALANCE, GAIT, AND ENDURANCE: ICD-10-CM

## 2023-04-20 DIAGNOSIS — M25.662 STIFFNESS OF LEFT KNEE: ICD-10-CM

## 2023-04-20 DIAGNOSIS — M25.662 DECREASED ROM OF LEFT KNEE: Primary | ICD-10-CM

## 2023-04-20 DIAGNOSIS — M62.81 QUADRICEPS WEAKNESS: ICD-10-CM

## 2023-04-20 DIAGNOSIS — R26.89 STIFF-KNEE GAIT: ICD-10-CM

## 2023-04-20 DIAGNOSIS — M25.669 STIFF-KNEE GAIT: ICD-10-CM

## 2023-04-20 PROCEDURE — 97110 THERAPEUTIC EXERCISES: CPT | Mod: PO | Performed by: PHYSICAL THERAPIST

## 2023-04-20 NOTE — PROGRESS NOTES
"              [facility]   Physical Therapy Treatment Note     [id number]    Therapy Diagnosis:   Encounter Diagnoses   Name Primary?    Decreased ROM of left knee Yes    Quadriceps weakness     Stiffness of left knee     Stiff-knee gait     Impaired functional mobility, balance, gait, and endurance      Physician: Carlos Kinney MD    Visit Date: 4/20/2023    Physician Orders: PT Eval and Treat left knee   Medical Diagnosis from Referral:   Status post left unicompartmental knee replacement [Z96.652]  Evaluation Date: 4/13/2023  Authorization Period Expiration: 12/31/2023  Plan of Care Expiration: 7/13/2023  Progress Note Due: 5/13/2023  Visit # / Visits authorized: 1/ 1, 3 / 12  FOTO: 1/ 3   PTA Visit #: 0/5     Precautions: Standard    Time In: 905  Time Out: 1000  Total Billable Time: 55 minutes      SUBJECTIVE     Pt reports: The knee does not hurt . Woke up a few times but not due to pain. Getting back to sleep was difficult.     She was issued a home exercise program. Doing the exercises at home. I feel good after I do the exercise at home.   Response to previous treatment: hurting after the last session. Pain was above the knee. It later resolved.   Functional change: ongoing   Pain: 0/10  Location: left knee        OBJECTIVE       Knee  Left  Pain/Dysfunction with Movement    AROM PROM MMT    Flexion 85  90 87  95 2+/5    Extension -33 -20 -20  -20 2+/5      TREATMENT        Renée received the treatments listed below:    - THERAPEUTIC EXERCISES to develop  strength, ROM, flexibility, and pain reduction  for 55 minutes including       .BOLD INDICATES ACTIVITIES PERFORMED / DISCUSSED     Leg press   Recumbent bike  Upright bike   UE ergometer  Treadmill   Elliptical       THERAPEUTIC EXERCISE  SUPINE  -posterior knee stretch with ankle pumps 3'  -quad set with towel at heel 7" x 20  -quad set with towel behind the knee 7" x " "20  -ball squeeze hold 7" x 12  -Heel slides  x15   -SLR   x20  -SAQ  2 x 15 red roll 8"     SIDELYING    PRONE    STANDING.  -weight shift x20  -zara step over x20  -TKE w/ hip hiking x20    SITTING  -seated heel slides x15   -seated sust flexion 2'    MANUAL THERAPY TECHNIQUES  were applied to left knee  for 0 minutes.  -passive PA at the knee with tibial ER to enhance passive extension.     MODALITY:  Ice applied to the left knee for 10 min at EOS.       PATIENT EDUCATION AND HOME EXERCISES     Home Exercises Provided and Patient Education Provided     Education provided:   - - gait precautions   -importance of gaining full knee extension actively and passively,      Written Home Exercises Provided: yes.  Exercises were reviewed and Renée was able to demonstrate them prior to the end of the session.  Renée demonstrated good  understanding of the education provided.      See EMR under Patient Instructions for exercises provided 4/13/2023.      ASSESSMENT     The patient performed the activities noted with improved quality of knee mobility. She also was ambulating with improved knee flexion at toe off and swing thru phase of her gait. There was adequate hip flexion with knee flexion that she demonstrated during zara step over. Overall her session seemed productive. Progress with ROM and CC activities.     Renée Is progressing towards her goals.   Pt prognosis is Good.     Pt will continue to benefit from skilled outpatient physical therapy to address the deficits listed in the problem list box on initial evaluation, provide pt/family education and to maximize pt's level of independence in the home and community environment.     Pt's spiritual, cultural and educational needs considered and pt agreeable to plan of care and goals.     Anticipated barriers to physical therapy: none     Goals:   Short Term Goals ( 1 week )   1. Pt will report reduced pain by 20 to 40% or greater for improved mobility, " sleep  and ambulation. ONGOING  2. Patients knee edema reduced by 1/4 to 1/2 inch or greater to enhance flexion ROM and knee comfort. ONGOING  3. Pt to report minimal to no pain to palpation for improved level of comfort. ONGOING  4. Pt to demonstrate symmetrical weight bearing w/o increased pain for stability and functional ambulation .ONGOING  5. Pts neuromuscular response will be enhanced for unilateral standing stability and balance ONGOING  6. Pt to achieve 90 degrees of passive knee flexion for restoring functional knee mobility, ambulating with proper gait pattern and sit to stand ability. ONGOING  7 Pt to report understanding of all instruction pertinent to patient safety , gait instruction and HEP.  ONGOING  8. Pt to exhibit correct return demonstration of exercises for self-management and independence with HEP. ONGOING        Long Term Goals: 12 weeks  1. Pt will demonstrate increased knee flexion AROM to 120 degrees or greater for return to functional activity. ONGOING  2. Pt will demonstrate increased knee extension AROM to 0 degrees for standing stability ONGOING  3. Pt will demonstrate increased RLE strength by 1/2 to 1 grade for improved functional stability. ONGOING  4. Pt to demonstrate standing stability unsupported on dynamic surfaces for functional return to ADL and community ambulation. ONGOING  5.The patient will be independent amb with no assistive device on all surfaces for community distances and return to work. ONGOING  6..Pt to demonstrate independence with HEP for self management. ONGOING     PLAN   Plan of care Certification: 4/13/2023 to 7/13/2023.     Outpatient Physical Therapy 2 times weekly for 10 weeks to include the following interventions: Gait Training, Manual Therapy, Neuromuscular Re-ed, Patient Education, and Therapeutic Exercise.       oRb Arellano, PT

## 2023-04-26 ENCOUNTER — CLINICAL SUPPORT (OUTPATIENT)
Dept: REHABILITATION | Facility: HOSPITAL | Age: 53
End: 2023-04-26
Payer: MEDICAID

## 2023-04-26 DIAGNOSIS — R26.89 STIFF-KNEE GAIT: ICD-10-CM

## 2023-04-26 DIAGNOSIS — M25.662 STIFFNESS OF LEFT KNEE: ICD-10-CM

## 2023-04-26 DIAGNOSIS — M25.662 DECREASED ROM OF LEFT KNEE: Primary | ICD-10-CM

## 2023-04-26 DIAGNOSIS — M62.81 QUADRICEPS WEAKNESS: ICD-10-CM

## 2023-04-26 DIAGNOSIS — M25.669 STIFF-KNEE GAIT: ICD-10-CM

## 2023-04-26 DIAGNOSIS — Z74.09 IMPAIRED FUNCTIONAL MOBILITY, BALANCE, GAIT, AND ENDURANCE: ICD-10-CM

## 2023-04-26 PROCEDURE — 97110 THERAPEUTIC EXERCISES: CPT | Mod: PO | Performed by: PHYSICAL THERAPIST

## 2023-04-26 NOTE — PROGRESS NOTES
[facility]   Physical Therapy Treatment Note     [id number]    Therapy Diagnosis:   Encounter Diagnoses   Name Primary?    Decreased ROM of left knee Yes    Quadriceps weakness     Stiffness of left knee     Stiff-knee gait     Impaired functional mobility, balance, gait, and endurance      Physician: Carlos Kinney MD    Visit Date: 4/26/2023    Physician Orders: PT Eval and Treat left knee   Medical Diagnosis from Referral:   Status post left unicompartmental knee replacement [Z96.652]  Evaluation Date: 4/13/2023  Authorization Period Expiration: 12/31/2023  Plan of Care Expiration: 7/13/2023  Progress Note Due: 5/13/2023  Visit # / Visits authorized: 1/ 1, 3 / 12  FOTO: 1/ 3   PTA Visit #: 0/5     Precautions: Standard  SURGERY - 3/29/2023     Time In: 905  Time Out: 1010  Total Billable Time: 65 minutes      SUBJECTIVE     Pt reports: The knee hurts and is tight.  The pain is only there if I try to bend the knee. Walking and sitting does not bother me at all. To see my MD 5/2/2023.     She was issued a home exercise program. Doing the exercises at home. I feel good after I do the exercise at home.   Response to previous treatment: hurting after the last session. Pain was above the knee. It later resolved.   Functional change: ongoing   Pain: 4 / 10  intermittent   Location: left knee        OBJECTIVE       Knee  Left  Pain/Dysfunction with Movement    AROM PROM MMT    Flexion 85  90 87  95 2+/5    Extension -33 -20 -20  -20 2+/5      TREATMENT        Renée received the treatments listed below:    - THERAPEUTIC EXERCISES to develop  strength, ROM, flexibility, and pain reduction  for 60 minutes including       .BOLD INDICATES ACTIVITIES PERFORMED / DISCUSSED     Leg press   Recumbent bike  Upright bike   UE ergometer  Treadmill   Elliptical       THERAPEUTIC EXERCISE  SUPINE  -posterior knee stretch with ankle pumps  "3'  -quad set with towel at heel 7" x 20  -quad set with towel behind the knee 7" x 20  -ball squeeze hold 7" x 12  -Heel slides  x15   -SLR   x20  -SAQ  2 x 15 red roll 8"     SIDELYING  -hip abd / add x20 ea    PRONE    STANDING.  -weight shift x20  -zara step over x20  -TKE w/ hip hiking x20    SITTING  -seated heel slides x15   -seated sust flexion 2'    MANUAL THERAPY TECHNIQUES  were applied to left knee  for 8 minutes.  -passive PA at the knee with tibial ER / IR  to enhance passive extension and flexion   -passive flexion with overpressure     MODALITY:  Ice applied to the left knee for 10 min at EOS.       PATIENT EDUCATION AND HOME EXERCISES     Home Exercises Provided and Patient Education Provided     Education provided: patient educated on importanc  - - gait precautions   -importance of gaining full knee extension actively and passively,      Written Home Exercises Provided: yes.  Exercises were reviewed and Renée was able to demonstrate them prior to the end of the session.  Renée demonstrated good  understanding of the education provided.      See EMR under Patient Instructions for exercises provided 4/13/2023.      ASSESSMENT     The patient has not demonstrated any progression since last week with passive knee flexion or extension. She remains guarded and is apprehensive to bend the knee further than she chooses due to pain at end ranges. She applied a new bandage over the incision with the steri-strips remaining intact. The wound is well healed, skin temp warm and no signs of seepage or erythema.  Focus on increasing passive motions in flexion and extension at this time.     Renée Is progressing towards her goals.   Pt prognosis is Good.     Pt will continue to benefit from skilled outpatient physical therapy to address the deficits listed in the problem list box on initial evaluation, provide pt/family education and to maximize pt's level of independence in the home and community " environment.     Pt's spiritual, cultural and educational needs considered and pt agreeable to plan of care and goals.     Anticipated barriers to physical therapy: none     Goals:   Short Term Goals ( 1 week )   1. Pt will report reduced pain by 20 to 40% or greater for improved mobility, sleep  and ambulation. ONGOING  2. Patients knee edema reduced by 1/4 to 1/2 inch or greater to enhance flexion ROM and knee comfort. ONGOING  3. Pt to report minimal to no pain to palpation for improved level of comfort. ONGOING  4. Pt to demonstrate symmetrical weight bearing w/o increased pain for stability and functional ambulation .ONGOING  5. Pts neuromuscular response will be enhanced for unilateral standing stability and balance ONGOING  6. Pt to achieve 90 degrees of passive knee flexion for restoring functional knee mobility, ambulating with proper gait pattern and sit to stand ability. ONGOING  7 Pt to report understanding of all instruction pertinent to patient safety , gait instruction and HEP.  ONGOING  8. Pt to exhibit correct return demonstration of exercises for self-management and independence with HEP. ONGOING        Long Term Goals: 12 weeks  1. Pt will demonstrate increased knee flexion AROM to 120 degrees or greater for return to functional activity. ONGOING  2. Pt will demonstrate increased knee extension AROM to 0 degrees for standing stability ONGOING  3. Pt will demonstrate increased RLE strength by 1/2 to 1 grade for improved functional stability. ONGOING  4. Pt to demonstrate standing stability unsupported on dynamic surfaces for functional return to ADL and community ambulation. ONGOING  5.The patient will be independent amb with no assistive device on all surfaces for community distances and return to work. ONGOING  6..Pt to demonstrate independence with HEP for self management. ONGOING     PLAN   Plan of care Certification: 4/13/2023 to 7/13/2023.     Outpatient Physical Therapy 2 times weekly for 10  weeks to include the following interventions: Gait Training, Manual Therapy, Neuromuscular Re-ed, Patient Education, and Therapeutic Exercise.       Rob Arellano, PT

## 2023-05-24 ENCOUNTER — DOCUMENTATION ONLY (OUTPATIENT)
Dept: REHABILITATION | Facility: HOSPITAL | Age: 53
End: 2023-05-24
Payer: MEDICAID

## 2023-05-24 NOTE — PROGRESS NOTES
Outpatient Therapy Discharge Summary     [id number]    Therapy Diagnosis:        Encounter Diagnoses   Name Primary?    Decreased ROM of left knee      Quadriceps weakness      Stiffness of left knee      Stiff-knee gait      Impaired functional mobility, balance, gait, and endurance        Physician: Carlos Kinney MD    Physician Orders: PT Eval and Treat left knee   Medical Diagnosis from Referral:   Status post left unicompartmental knee replacement [Z96.652]  Evaluation Date: 4/13/2023  Date of Last visit: 4/26/2023  Total Visits Received: 5 including eval     Assessment    Goals:    Short Term Goals ( 1 week )   1. Pt will report reduced pain by 20 to 40% or greater for improved mobility, sleep  and ambulation. ONGOING  2. Patients knee edema reduced by 1/4 to 1/2 inch or greater to enhance flexion ROM and knee comfort. ONGOING  3. Pt to report minimal to no pain to palpation for improved level of comfort. ONGOING  4. Pt to demonstrate symmetrical weight bearing w/o increased pain for stability and functional ambulation .ONGOING  5. Pts neuromuscular response will be enhanced for unilateral standing stability and balance ONGOING  6. Pt to achieve 90 degrees of passive knee flexion for restoring functional knee mobility, ambulating with proper gait pattern and sit to stand ability. ONGOING  7 Pt to report understanding of all instruction pertinent to patient safety , gait instruction and HEP.  ONGOING  8. Pt to exhibit correct return demonstration of exercises for self-management and independence with HEP. ONGOING        Long Term Goals: 12 weeks  1. Pt will demonstrate increased knee flexion AROM to 120 degrees or greater for return to functional activity. ONGOING  2. Pt will demonstrate increased knee extension AROM to 0 degrees for standing stability ONGOING  3. Pt will demonstrate increased RLE strength by 1/2 to 1 grade for improved functional  stability. ONGOING  4. Pt to demonstrate standing stability unsupported on dynamic surfaces for functional return to ADL and community ambulation. ONGOING  5.The patient will be independent amb with no assistive device on all surfaces for community distances and return to work. ONGOING  6..Pt to demonstrate independence with HEP for self management. ONGOING    Discharge reason: Patient requested discharge. Attending therapy at an [facility] clinic. Patient underwent surgery at [facility] where surgeon is on staff.   Plan   This patient is discharged from Physical Therapy    Rob Arellano PT